# Patient Record
Sex: MALE | Race: WHITE | NOT HISPANIC OR LATINO | Employment: OTHER | URBAN - METROPOLITAN AREA
[De-identification: names, ages, dates, MRNs, and addresses within clinical notes are randomized per-mention and may not be internally consistent; named-entity substitution may affect disease eponyms.]

---

## 2019-12-20 ENCOUNTER — AMBULATORY - HEALTHEAST (OUTPATIENT)
Dept: PALLIATIVE MEDICINE | Facility: OTHER | Age: 59
End: 2019-12-20

## 2019-12-20 DIAGNOSIS — M54.50 LOW BACK PAIN: ICD-10-CM

## 2020-02-18 ENCOUNTER — HOSPITAL ENCOUNTER (OUTPATIENT)
Dept: PALLIATIVE MEDICINE | Facility: OTHER | Age: 60
Discharge: HOME | End: 2020-02-18

## 2020-02-18 DIAGNOSIS — M54.41 CHRONIC BILATERAL LOW BACK PAIN WITH BILATERAL SCIATICA: ICD-10-CM

## 2020-02-18 DIAGNOSIS — G89.4 CHRONIC PAIN SYNDROME: ICD-10-CM

## 2020-02-18 DIAGNOSIS — M54.42 CHRONIC BILATERAL LOW BACK PAIN WITH BILATERAL SCIATICA: ICD-10-CM

## 2020-02-18 DIAGNOSIS — G89.29 CHRONIC BILATERAL LOW BACK PAIN WITH BILATERAL SCIATICA: ICD-10-CM

## 2020-04-07 ENCOUNTER — RECORDS - HEALTHEAST (OUTPATIENT)
Dept: ADMINISTRATIVE | Facility: OTHER | Age: 60
End: 2020-04-07

## 2021-05-27 ENCOUNTER — RECORDS - HEALTHEAST (OUTPATIENT)
Dept: ADMINISTRATIVE | Facility: CLINIC | Age: 61
End: 2021-05-27

## 2021-05-28 ENCOUNTER — RECORDS - HEALTHEAST (OUTPATIENT)
Dept: ADMINISTRATIVE | Facility: CLINIC | Age: 61
End: 2021-05-28
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
## 2022-05-18 ENCOUNTER — OFFICE VISIT (OUTPATIENT)
Dept: FAMILY MEDICINE | Facility: CLINIC | Age: 62
End: 2022-05-18
Payer: COMMERCIAL

## 2022-05-18 ENCOUNTER — NURSE TRIAGE (OUTPATIENT)
Dept: NURSING | Facility: CLINIC | Age: 62
End: 2022-05-18

## 2022-05-18 VITALS
HEART RATE: 76 BPM | TEMPERATURE: 97.9 F | OXYGEN SATURATION: 96 % | DIASTOLIC BLOOD PRESSURE: 87 MMHG | SYSTOLIC BLOOD PRESSURE: 130 MMHG | RESPIRATION RATE: 20 BRPM

## 2022-05-18 DIAGNOSIS — S91.332A PUNCTURE WOUND OF LEFT FOOT, INITIAL ENCOUNTER: Primary | ICD-10-CM

## 2022-05-18 PROBLEM — L73.8 OTHER SPECIFIED DISEASES OF HAIR AND HAIR FOLLICLES: Status: ACTIVE | Noted: 2022-05-18

## 2022-05-18 PROBLEM — M54.50 CHRONIC LOW BACK PAIN: Status: ACTIVE | Noted: 2022-05-18

## 2022-05-18 PROBLEM — G89.29 CHRONIC LOW BACK PAIN: Status: ACTIVE | Noted: 2022-05-18

## 2022-05-18 PROBLEM — G47.33 OBSTRUCTIVE SLEEP APNEA (ADULT) (PEDIATRIC): Status: ACTIVE | Noted: 2022-05-18

## 2022-05-18 PROBLEM — L67.8 OTHER SPECIFIED DISEASES OF HAIR AND HAIR FOLLICLES: Status: ACTIVE | Noted: 2022-05-18

## 2022-05-18 PROCEDURE — 90471 IMMUNIZATION ADMIN: CPT | Performed by: PHYSICIAN ASSISTANT

## 2022-05-18 PROCEDURE — 99203 OFFICE O/P NEW LOW 30 MIN: CPT | Mod: 25 | Performed by: PHYSICIAN ASSISTANT

## 2022-05-18 PROCEDURE — 90715 TDAP VACCINE 7 YRS/> IM: CPT | Performed by: PHYSICIAN ASSISTANT

## 2022-05-18 RX ORDER — CEPHALEXIN 500 MG/1
500 CAPSULE ORAL 4 TIMES DAILY
Qty: 40 CAPSULE | Refills: 0 | Status: SHIPPED | OUTPATIENT
Start: 2022-05-18 | End: 2022-05-19

## 2022-05-18 RX ORDER — ATORVASTATIN CALCIUM 80 MG/1
40 TABLET, FILM COATED ORAL
COMMUNITY
Start: 2022-05-05

## 2022-05-18 RX ORDER — NAPROXEN 250 MG/1
250 TABLET ORAL
COMMUNITY
Start: 2022-03-10

## 2022-05-18 RX ORDER — CETIRIZINE HYDROCHLORIDE 10 MG/1
10 TABLET ORAL
COMMUNITY
Start: 2022-01-20

## 2022-05-18 RX ORDER — TAMSULOSIN HYDROCHLORIDE 0.4 MG/1
0.4 CAPSULE ORAL
COMMUNITY
Start: 2022-02-17

## 2022-05-18 RX ORDER — FINASTERIDE 5 MG/1
5 TABLET, FILM COATED ORAL
COMMUNITY
Start: 2022-05-05

## 2022-05-18 RX ORDER — SILDENAFIL 100 MG/1
100 TABLET, FILM COATED ORAL
COMMUNITY
Start: 2021-09-16

## 2022-05-18 NOTE — PROGRESS NOTES
Assessment & Plan:        ICD-10-CM    1. Puncture wound of left foot, initial encounter  S91.332A XR Foot Left G/E 3 Views     DISCONTINUED: cephALEXin (KEFLEX) 500 MG capsule         Plan/Clinical Decision Making:    Xray- no acute fracture seen, navicular as chronic appearing abnormality.   Due to increase in swelling with mild erythema will treat for possible infection.   Start Keflex 500mg bid.     Discussed Follow-up in not improving in 48-72 hours.   Follow-up sooner if more severe pain or fever.     At the end of the encounter, I discussed results, diagnosis, medications. Discussed red flags for immediate return to clinic/ER, as well as indications for follow up if no improvement. Patient understood and agreed to plan. Patient was stable for discharge.        Keturah Luo PA-C on 5/18/2022 at 1:19 PM          Subjective:     HPI:    Loc is a 61 year old male who presents to clinic today for the following health issues:  Chief Complaint   Patient presents with     Wound Check     Left Foot injury x 2 days ago      HPI    Patient complains of puncture wound on Monday from metal piece on .    Patient having increased swelling, pain, slight erythema.   Last Tdap 10 years ago, will up date today.     History obtained from the patient.    Review of Systems  No fever  No numbness or tingling    Patient Active Problem List   Diagnosis     Chronic low back pain     Obstructive sleep apnea (adult) (pediatric)     Other specified diseases of hair and hair follicles        No past medical history on file.    Social History     Tobacco Use     Smoking status: Current Every Day Smoker     Smokeless tobacco: Never Used   Substance Use Topics     Alcohol use: Not on file             Objective:     Vitals:    05/18/22 1300   BP: 130/87   Pulse: 76   Resp: 20   Temp: 97.9  F (36.6  C)   SpO2: 96%         Physical Exam   EXAM:   Pleasant, alert, appropriate appearance. NAD.  Head Exam: Normocephalic,  atraumatic.  Left foot- puncture wound anterior medial ankle. Has swelling, mild erythema, no induration, tenderness.   Skin: no rash or lesion.      Results:  Results for orders placed or performed in visit on 05/18/22   XR Foot Left G/E 3 Views     Status: None    Narrative    EXAM: XR FOOT LEFT G/E 3 VIEWS  LOCATION: [facility]  DATE/TIME: 5/18/2022 1:48 PM    INDICATION: Left foot pain, puncture wound.  COMPARISON: None.      Impression    IMPRESSION: Left foot negative for fracture or erosion. Normal joint alignment and spacing. Minimal degenerative changes in the midfoot and toes. 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 Tiny plantar and Achilles calcaneal enthesophytes.
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
## 2022-05-19 ENCOUNTER — TELEPHONE (OUTPATIENT)
Dept: FAMILY MEDICINE | Facility: CLINIC | Age: 62
End: 2022-05-19

## 2022-05-19 DIAGNOSIS — S91.332A PUNCTURE WOUND OF LEFT FOOT, INITIAL ENCOUNTER: ICD-10-CM

## 2022-05-19 RX ORDER — CEPHALEXIN 500 MG/1
500 CAPSULE ORAL 4 TIMES DAILY
Qty: 40 CAPSULE | Refills: 0 | Status: SHIPPED | OUTPATIENT
Start: 2022-05-19

## 2022-05-19 NOTE — TELEPHONE ENCOUNTER
Reason for Call:  Other prescription    Detailed comments: Pt called and stated he was seen in [facility] yesterday and was given a Rx but he wants the Rx sent to a different pharmacy. Pt would like the Rx resent to [facility] pharmacy [address] [place] [address] [address] in [place], [phone]    Phone Number Patient can be reached at: Home number on file [phone] (home)    Best Time: any    Can we leave a detailed message on this number? YES    Call taken on 5/19/2022 at 11:24 AM by Maite Meléndez